# Patient Record
Sex: FEMALE | Race: BLACK OR AFRICAN AMERICAN | NOT HISPANIC OR LATINO | Employment: UNEMPLOYED | URBAN - METROPOLITAN AREA
[De-identification: names, ages, dates, MRNs, and addresses within clinical notes are randomized per-mention and may not be internally consistent; named-entity substitution may affect disease eponyms.]

---

## 2020-07-21 PROBLEM — H61.91 SKIN LESION OF RIGHT EXTERNAL EAR: Status: ACTIVE | Noted: 2020-07-21

## 2020-07-21 PROBLEM — Q17.9 EAR ANOMALY: Status: ACTIVE | Noted: 2020-07-21

## 2022-09-19 PROBLEM — D21.9 FIBROIDS: Status: ACTIVE | Noted: 2022-09-19

## 2022-09-19 PROBLEM — D50.9 MICROCYTIC ANEMIA: Status: ACTIVE | Noted: 2022-09-19

## 2022-09-19 PROBLEM — E87.29 HIGH ANION GAP METABOLIC ACIDOSIS: Status: ACTIVE | Noted: 2022-09-19

## 2022-09-19 PROBLEM — S82.141A CLOSED FRACTURE OF RIGHT TIBIAL PLATEAU: Status: ACTIVE | Noted: 2022-09-19

## 2022-09-20 PROBLEM — N13.39 OTHER HYDRONEPHROSIS: Status: ACTIVE | Noted: 2022-09-20

## 2022-09-20 PROBLEM — D50.0 IRON DEFICIENCY ANEMIA DUE TO CHRONIC BLOOD LOSS: Status: ACTIVE | Noted: 2022-09-20

## 2022-09-20 PROBLEM — N13.4 HYDROURETER: Status: ACTIVE | Noted: 2022-09-20

## 2022-09-20 PROBLEM — S37.011A CONTUSION OF RIGHT KIDNEY: Status: ACTIVE | Noted: 2022-09-20
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
## 2022-10-06 PROBLEM — A59.9 TRICHOMONAS INFECTION: Status: ACTIVE | Noted: 2022-10-06

## 2022-10-20 PROBLEM — N92.0 MENORRHAGIA: Status: ACTIVE | Noted: 2022-10-20

## 2022-10-20 PROBLEM — R26.2 DIFFICULTY WALKING: Status: ACTIVE | Noted: 2022-10-20

## 2022-10-20 PROBLEM — M25.661 DECREASED RANGE OF MOTION (ROM) OF RIGHT KNEE: Status: ACTIVE | Noted: 2022-10-20

## 2022-11-08 PROBLEM — G89.18 ACUTE POST-OPERATIVE PAIN: Status: ACTIVE | Noted: 2022-11-08

## 2023-01-13 PROBLEM — F22 DELUSIONS: Status: ACTIVE | Noted: 2023-01-13

## 2023-01-15 PROBLEM — F20.0 PARANOID SCHIZOPHRENIA: Status: ACTIVE | Noted: 2023-01-15

## 2023-01-15 PROBLEM — Z87.81 S/P ORIF (OPEN REDUCTION INTERNAL FIXATION) FRACTURE: Status: ACTIVE | Noted: 2023-01-15

## 2023-01-15 PROBLEM — F29 SCHIZOPHRENIA SPECTRUM DISORDER WITH PSYCHOTIC DISORDER TYPE NOT YET DETERMINED: Status: ACTIVE | Noted: 2023-01-15

## 2023-01-15 PROBLEM — F16.20: Status: ACTIVE | Noted: 2023-01-15

## 2023-01-15 PROBLEM — Z98.890 S/P ORIF (OPEN REDUCTION INTERNAL FIXATION) FRACTURE: Status: ACTIVE | Noted: 2023-01-15

## 2023-02-13 PROBLEM — G89.18 ACUTE POST-OPERATIVE PAIN: Status: RESOLVED | Noted: 2022-11-08 | Resolved: 2023-02-13

## 2023-10-03 ENCOUNTER — E-CONSULT (OUTPATIENT)
Dept: HEMATOLOGY/ONCOLOGY | Facility: CLINIC | Age: 43
End: 2023-10-03
Payer: MEDICAID

## 2023-10-03 DIAGNOSIS — D50.0 IRON DEFICIENCY ANEMIA DUE TO CHRONIC BLOOD LOSS: Primary | ICD-10-CM

## 2023-10-03 PROCEDURE — 99499 UNLISTED E&M SERVICE: CPT | Mod: S$PBB,,, | Performed by: INTERNAL MEDICINE

## 2023-10-03 PROCEDURE — 99499 NO LOS: ICD-10-PCS | Mod: S$PBB,,, | Performed by: INTERNAL MEDICINE

## 2023-10-03 NOTE — CONSULTS
[facility] [[email or web address]] - Hem Onc 3rd Fl  Response for E-Consult     Patient Name: Kiki Garcia  MRN: [id number]  Primary Care Provider: Yanick Mcpherson II, MD   Requesting Provider: Gail Robertson P*  E-Consult to [place]  Consult performed by: Radha Orantes MD  Consult ordered by: Gail Robertson, CLAUDIA  Reason for consult: WAGNER  Assessment/Recommendations: See Consult           After evaluation of your eConsult clinical questions, I believe the patient should be scheduled for an office visit in our specialty due to the need for IV iron.     Last iron panel was in 9/22, iron saturation and ferritin both at 5, Hb has remained in the 8 g/dl range. I agree that she needs IV iron. Please check iron panel and ferritin at this time and refer for a virtual or in person visit so IV iron can be ordered.     *This eConsult is based on the clinical data available to me and is furnished without benefit of a physician examination.  The eConsult will need to be interpreted in light of any clinical issues of changes in patient status not available to me at the time rime of filing this eConsults.  Significant changes in patient condition of level of acuity should result in a referral for in person consultation and reevaluation.  Please alert me if you have any furth questions.     Thank you for this eConsult referral.     Radha Orantes MD  [facility] [[email or web address]] - Hem Onc 3rd Fl